# Patient Record
Sex: FEMALE | Race: WHITE | Employment: OTHER | URBAN - METROPOLITAN AREA
[De-identification: names, ages, dates, MRNs, and addresses within clinical notes are randomized per-mention and may not be internally consistent; named-entity substitution may affect disease eponyms.]

---

## 2017-06-13 PROBLEM — M25.561 KNEE PAIN, RIGHT: Status: ACTIVE | Noted: 2017-06-13

## 2018-05-29 ENCOUNTER — HOSPITAL ENCOUNTER (EMERGENCY)
Age: 68
Discharge: HOME | End: 2018-05-29

## 2018-05-29 ENCOUNTER — APPOINTMENT (OUTPATIENT)
Dept: GENERAL RADIOLOGY | Age: 68
End: 2018-05-29

## 2018-05-29 VITALS
SYSTOLIC BLOOD PRESSURE: 156 MMHG | DIASTOLIC BLOOD PRESSURE: 82 MMHG | OXYGEN SATURATION: 98 % | RESPIRATION RATE: 16 BRPM | HEIGHT: 64 IN | HEART RATE: 70 BPM | WEIGHT: 160 LBS | TEMPERATURE: 98.2 F | BODY MASS INDEX: 27.31 KG/M2

## 2018-05-29 DIAGNOSIS — S92.102A CLOSED NONDISPLACED FRACTURE OF LEFT TALUS, UNSPECIFIED PORTION OF TALUS, INITIAL ENCOUNTER: Primary | ICD-10-CM

## 2018-05-29 PROCEDURE — 73610 X-RAY EXAM OF ANKLE: CPT

## 2018-05-29 PROCEDURE — 99284 EMERGENCY DEPT VISIT MOD MDM: CPT

## 2018-05-29 PROCEDURE — 29515 APPLICATION SHORT LEG SPLINT: CPT

## 2018-05-29 PROCEDURE — 73630 X-RAY EXAM OF FOOT: CPT

## 2018-05-29 RX ORDER — ACETAMINOPHEN 500 MG
1000 TABLET ORAL EVERY 8 HOURS PRN
Qty: 20 TABLET | Refills: 0 | Status: SHIPPED | OUTPATIENT
Start: 2018-05-29 | End: 2018-07-02 | Stop reason: SINTOL

## 2018-05-29 ASSESSMENT — PAIN SCALES - GENERAL: PAINLEVEL_OUTOF10: 10

## 2018-05-29 ASSESSMENT — PAIN DESCRIPTION - ORIENTATION: ORIENTATION: RIGHT

## 2018-05-29 ASSESSMENT — PAIN DESCRIPTION - PAIN TYPE: TYPE: ACUTE PAIN

## 2018-05-29 ASSESSMENT — PAIN DESCRIPTION - LOCATION: LOCATION: ANKLE;FOOT

## 2018-05-30 ENCOUNTER — OFFICE VISIT (OUTPATIENT)
Dept: ORTHOPEDIC SURGERY | Age: 68
End: 2018-05-30
Payer: COMMERCIAL

## 2018-05-30 VITALS
HEART RATE: 78 BPM | BODY MASS INDEX: 27.31 KG/M2 | HEIGHT: 64 IN | DIASTOLIC BLOOD PRESSURE: 89 MMHG | SYSTOLIC BLOOD PRESSURE: 146 MMHG | WEIGHT: 160 LBS

## 2018-05-30 DIAGNOSIS — S99.912A ANKLE INJURIES, LEFT, INITIAL ENCOUNTER: Primary | ICD-10-CM

## 2018-05-30 PROCEDURE — 99213 OFFICE O/P EST LOW 20 MIN: CPT | Performed by: ORTHOPAEDIC SURGERY

## 2018-05-30 PROCEDURE — 28430 CLTX TALUS FRACTURE W/O MNPJ: CPT | Performed by: ORTHOPAEDIC SURGERY

## 2018-06-12 ENCOUNTER — APPOINTMENT (OUTPATIENT)
Dept: ULTRASOUND IMAGING | Age: 68
End: 2018-06-12
Payer: COMMERCIAL

## 2018-06-12 ENCOUNTER — HOSPITAL ENCOUNTER (EMERGENCY)
Age: 68
Discharge: HOME | End: 2018-06-13
Attending: EMERGENCY MEDICINE
Payer: COMMERCIAL

## 2018-06-12 VITALS
TEMPERATURE: 98.5 F | HEART RATE: 81 BPM | RESPIRATION RATE: 18 BRPM | HEIGHT: 64 IN | DIASTOLIC BLOOD PRESSURE: 103 MMHG | SYSTOLIC BLOOD PRESSURE: 174 MMHG | BODY MASS INDEX: 27.31 KG/M2 | OXYGEN SATURATION: 98 % | WEIGHT: 160 LBS

## 2018-06-12 DIAGNOSIS — M79.89 LEG SWELLING: Primary | ICD-10-CM

## 2018-06-12 PROCEDURE — 93971 EXTREMITY STUDY: CPT

## 2018-06-12 PROCEDURE — 99283 EMERGENCY DEPT VISIT LOW MDM: CPT

## 2018-06-12 ASSESSMENT — PAIN DESCRIPTION - ONSET: ONSET: ON-GOING

## 2018-06-12 ASSESSMENT — PAIN DESCRIPTION - PAIN TYPE: TYPE: ACUTE PAIN

## 2018-06-12 ASSESSMENT — PAIN DESCRIPTION - ORIENTATION: ORIENTATION: LEFT

## 2018-06-12 ASSESSMENT — PAIN DESCRIPTION - DESCRIPTORS: DESCRIPTORS: CONSTANT;DISCOMFORT;NUMBNESS

## 2018-06-12 ASSESSMENT — PAIN DESCRIPTION - LOCATION: LOCATION: FOOT;ANKLE

## 2018-06-12 ASSESSMENT — PAIN DESCRIPTION - FREQUENCY: FREQUENCY: CONTINUOUS

## 2018-06-12 ASSESSMENT — PAIN SCALES - GENERAL: PAINLEVEL_OUTOF10: 7

## 2018-07-02 ENCOUNTER — OFFICE VISIT (OUTPATIENT)
Dept: ORTHOPEDIC SURGERY | Age: 68
End: 2018-07-02

## 2018-07-02 VITALS
HEART RATE: 74 BPM | HEIGHT: 64 IN | DIASTOLIC BLOOD PRESSURE: 84 MMHG | WEIGHT: 160 LBS | SYSTOLIC BLOOD PRESSURE: 138 MMHG | BODY MASS INDEX: 27.31 KG/M2 | TEMPERATURE: 98.2 F

## 2018-07-02 DIAGNOSIS — S92.102D CLOSED NONDISPLACED FRACTURE OF LEFT TALUS WITH ROUTINE HEALING, UNSPECIFIED PORTION OF TALUS, SUBSEQUENT ENCOUNTER: Primary | ICD-10-CM

## 2018-07-02 PROCEDURE — 99024 POSTOP FOLLOW-UP VISIT: CPT | Performed by: ORTHOPAEDIC SURGERY

## 2018-07-02 RX ORDER — MULTIVIT WITH MINERALS/LUTEIN
1000 TABLET ORAL DAILY
COMMUNITY

## 2018-08-01 ENCOUNTER — OFFICE VISIT (OUTPATIENT)
Dept: ORTHOPEDIC SURGERY | Age: 68
End: 2018-08-01

## 2018-08-01 VITALS — RESPIRATION RATE: 16 BRPM | TEMPERATURE: 98.3 F

## 2018-08-01 DIAGNOSIS — S92.155D CLOSED NONDISPLACED AVULSION FRACTURE OF LEFT TALUS WITH ROUTINE HEALING, SUBSEQUENT ENCOUNTER: Primary | ICD-10-CM

## 2018-08-01 PROCEDURE — 99024 POSTOP FOLLOW-UP VISIT: CPT | Performed by: ORTHOPAEDIC SURGERY

## 2019-05-20 ENCOUNTER — APPOINTMENT (OUTPATIENT)
Dept: CT IMAGING | Age: 69
End: 2019-05-20
Payer: COMMERCIAL

## 2019-05-20 ENCOUNTER — HOSPITAL ENCOUNTER (EMERGENCY)
Age: 69
Discharge: LEFT BEFORE DISCHARGE | End: 2019-05-20
Attending: EMERGENCY MEDICINE
Payer: COMMERCIAL

## 2019-05-20 VITALS
DIASTOLIC BLOOD PRESSURE: 78 MMHG | RESPIRATION RATE: 18 BRPM | BODY MASS INDEX: 27.31 KG/M2 | HEIGHT: 64 IN | TEMPERATURE: 98 F | OXYGEN SATURATION: 98 % | SYSTOLIC BLOOD PRESSURE: 130 MMHG | HEART RATE: 80 BPM | WEIGHT: 160 LBS

## 2019-05-20 DIAGNOSIS — K62.5 BRIGHT RED BLOOD PER RECTUM: Primary | ICD-10-CM

## 2019-05-20 DIAGNOSIS — R10.84 GENERALIZED ABDOMINAL PAIN: ICD-10-CM

## 2019-05-20 DIAGNOSIS — K92.2 LOWER GI BLEED: ICD-10-CM

## 2019-05-20 LAB
ALBUMIN SERPL-MCNC: 4.6 G/DL (ref 3.5–5.2)
ALP BLD-CCNC: 79 U/L (ref 35–104)
ALT SERPL-CCNC: 17 U/L (ref 0–32)
ANION GAP SERPL CALCULATED.3IONS-SCNC: 9 MMOL/L (ref 7–16)
AST SERPL-CCNC: 20 U/L (ref 0–31)
BASOPHILS ABSOLUTE: 0.07 E9/L (ref 0–0.2)
BASOPHILS RELATIVE PERCENT: 1.1 % (ref 0–2)
BILIRUB SERPL-MCNC: 0.4 MG/DL (ref 0–1.2)
BUN BLDV-MCNC: 11 MG/DL (ref 8–23)
CALCIUM SERPL-MCNC: 9.8 MG/DL (ref 8.6–10.2)
CHLORIDE BLD-SCNC: 102 MMOL/L (ref 98–107)
CO2: 30 MMOL/L (ref 22–29)
CREAT SERPL-MCNC: 0.9 MG/DL (ref 0.5–1)
EOSINOPHILS ABSOLUTE: 0.29 E9/L (ref 0.05–0.5)
EOSINOPHILS RELATIVE PERCENT: 4.4 % (ref 0–6)
GFR AFRICAN AMERICAN: >60
GFR NON-AFRICAN AMERICAN: >60 ML/MIN/1.73
GLUCOSE BLD-MCNC: 96 MG/DL (ref 74–99)
HCT VFR BLD CALC: 42.6 % (ref 34–48)
HEMOGLOBIN: 13.6 G/DL (ref 11.5–15.5)
IMMATURE GRANULOCYTES #: 0.01 E9/L
IMMATURE GRANULOCYTES %: 0.2 % (ref 0–5)
INR BLD: 1
LACTIC ACID: 1.1 MMOL/L (ref 0.5–2.2)
LYMPHOCYTES ABSOLUTE: 1.64 E9/L (ref 1.5–4)
LYMPHOCYTES RELATIVE PERCENT: 25 % (ref 20–42)
MCH RBC QN AUTO: 29.4 PG (ref 26–35)
MCHC RBC AUTO-ENTMCNC: 31.9 % (ref 32–34.5)
MCV RBC AUTO: 92 FL (ref 80–99.9)
MONOCYTES ABSOLUTE: 0.55 E9/L (ref 0.1–0.95)
MONOCYTES RELATIVE PERCENT: 8.4 % (ref 2–12)
NEUTROPHILS ABSOLUTE: 4.01 E9/L (ref 1.8–7.3)
NEUTROPHILS RELATIVE PERCENT: 60.9 % (ref 43–80)
PDW BLD-RTO: 13.4 FL (ref 11.5–15)
PLATELET # BLD: 201 E9/L (ref 130–450)
PMV BLD AUTO: 12.7 FL (ref 7–12)
POTASSIUM SERPL-SCNC: 4.4 MMOL/L (ref 3.5–5)
PROTHROMBIN TIME: 11.1 SEC (ref 9.3–12.4)
RBC # BLD: 4.63 E12/L (ref 3.5–5.5)
SODIUM BLD-SCNC: 141 MMOL/L (ref 132–146)
TOTAL PROTEIN: 7.1 G/DL (ref 6.4–8.3)
WBC # BLD: 6.6 E9/L (ref 4.5–11.5)

## 2019-05-20 PROCEDURE — 80053 COMPREHEN METABOLIC PANEL: CPT

## 2019-05-20 PROCEDURE — 85025 COMPLETE CBC W/AUTO DIFF WBC: CPT

## 2019-05-20 PROCEDURE — 99284 EMERGENCY DEPT VISIT MOD MDM: CPT

## 2019-05-20 PROCEDURE — 85610 PROTHROMBIN TIME: CPT

## 2019-05-20 PROCEDURE — 83605 ASSAY OF LACTIC ACID: CPT

## 2019-05-20 RX ORDER — AMOXICILLIN AND CLAVULANATE POTASSIUM 875; 125 MG/1; MG/1
1 TABLET, FILM COATED ORAL 2 TIMES DAILY
Qty: 20 TABLET | Refills: 0 | Status: SHIPPED | OUTPATIENT
Start: 2019-05-20 | End: 2019-05-30

## 2019-05-20 ASSESSMENT — ENCOUNTER SYMPTOMS
CHEST TIGHTNESS: 0
COLOR CHANGE: 0
SHORTNESS OF BREATH: 0
NAUSEA: 0
DIARRHEA: 0
BLOOD IN STOOL: 1
VOMITING: 0
COUGH: 0
ABDOMINAL PAIN: 1
BACK PAIN: 0

## 2019-05-20 NOTE — ED PROVIDER NOTES
normal. She exhibits no distension. There is tenderness (mild, right lateral). Genitourinary: Rectal exam shows guaiac negative stool (Negative guaiac, no palpable hemorrhoids). Musculoskeletal: Normal range of motion. She exhibits no edema. Lymphadenopathy:     She has no cervical adenopathy. Neurological: She is alert and oriented to person, place, and time. Skin: Skin is warm and dry. No rash noted. She is not diaphoretic. No pallor. Nursing note and vitals reviewed. Procedures    MDM    ED Course as of May 20 1718   Mon May 20, 2019   1644 Attempted CT - patient became upset due to claustrophobia. Offered patient ativan to aid in performing CT, patient states she wishes to leave now. Discussed awaiting for result of labs to which she consented. Discussed risks of not having imaging performed to which she verbalized understanding. [RU]      ED Course User Index  [RU] Melissa Woody, DO       --------------------------------------------- PAST HISTORY ---------------------------------------------  Past Medical History:  has a past medical history of Back pain, Colitis, and Hypertension. Past Surgical History:  has a past surgical history that includes cyst removal and Colonoscopy. Social History:  reports that she has never smoked. She has never used smokeless tobacco. She reports that she does not drink alcohol or use drugs. Family History: family history is not on file. The patients home medications have been reviewed. Allergies: Latex; Iodine; Lomotil [diphenoxylate-atropine]; Other; Oxycodone; Pepcid [famotidine]; Percocet [oxycodone-acetaminophen]; Ultram [tramadol]; Zovirax [acyclovir]; Codeine; Diphenoxylate-atropine; Diprolene [betamethasone dipropionate aug]; Flagyl [metronidazole];  Naproxen; Prednisone; and Tolectin [tolmetin sodium]    -------------------------------------------------- RESULTS -------------------------------------------------    Lab  No results found for this visit on 05/20/19. Radiology  CT ABDOMEN PELVIS WO CONTRAST Additional Contrast? None    (Results Pending)       ------------------------- NURSING NOTES AND VITALS REVIEWED ---------------------------  Date / Time Roomed:  5/20/2019  3:28 PM  ED Bed Assignment:  07/07    The nursing notes within the ED encounter and vital signs as below have been reviewed. Patient Vitals for the past 24 hrs:   BP Temp Temp src Pulse Resp SpO2 Height Weight   05/20/19 1450 138/85 98.2 °F (36.8 °C) Oral 70 16 99 % 5' 4\" (1.626 m) 160 lb (72.6 kg)       Oxygen Saturation Interpretation: Normal    ------------------------------------------ PROGRESS NOTES ------------------------------------------  Re-evaluation(s):  4:20 PM: Attending updated. Case and plan discussed. Evaluated patient, reports taking up to 12 ASA tablets daily for her chronic back pain. 5:19 PM: Discussed again with patient options, wishes to leave at this time. Results reviewed. Alert, oriented, and has decision making capacity.    --------------------------------------- ED Clinical Course/MDM --------------------------------------    Patient is a 69-year-old female presenting with bright red blood per rectum. Labs, imaging ordered. Patient was unable to undergo CT scan due to claustrophobia. She adamantly refuses any premedication for a 2nd attempt. Patient was upset, wish to immediately leave the department, however but she did consent to awaiting her lab results. Hemoglobin stable, vital stable. No further bleeding here in the department reported. Patient was counseled on risks, benefits, alternatives but wished to leave against medical advice at this time. Patient is alert, oriented, appears to have decision-making capacity. She is aware she may return at any time. Decision made to cover empirically with Augmentin. She'll follow closely with her PCP outpatient.     5:19 PM  I have spoken with the patient and discussed todays availabe results to this point, in addition to providing specific details for the plan of care and counseling regarding the diagnosis and prognosis. Their questions are answered, however they are not agreeable to admission.     --------------------------------- ADDITIONAL PROVIDER NOTES ---------------------------------  This patient has chosen to leave against medical advice. I have personally explained to them that choosing to do so may result in permanent bodily harm or death. I discussed at length that without further evaluation and monitoring there may be unforeseen circumstances and deterioration resulting in permanent bodily harm or death as a result of their choice. They are alert, oriented, and competent at this time. They state that they are aware of the serious risks as explained, but they continue to wish to leave against medical   advice. In light of their decision to leave against medical advice, follow-up has been arranged and they are aware of the importance of following up as instructed. They have been advised that they should return to the ED immediately if they change their mind at any time, or if their condition begins to change or worsen. The follow up plan has been discussed in detail and they are aware of the specific conditions for emergent return, as well as the importance of follow-up. New Prescriptions    AMOXICILLIN-CLAVULANATE (AUGMENTIN) 875-125 MG PER TABLET    Take 1 tablet by mouth 2 times daily for 10 days       Diagnosis:  1. Bright red blood per rectum    2. Lower GI bleed    3. Generalized abdominal pain        Disposition:  Patient's disposition: Left against medical advice. Patient's condition is stable.          Katharina Gilliam DO  Resident  05/20/19 2227

## 2020-01-15 ENCOUNTER — OFFICE VISIT (OUTPATIENT)
Dept: ORTHOPEDIC SURGERY | Age: 70
End: 2020-01-15
Payer: COMMERCIAL

## 2020-01-15 VITALS
WEIGHT: 160 LBS | SYSTOLIC BLOOD PRESSURE: 149 MMHG | HEIGHT: 64 IN | HEART RATE: 71 BPM | BODY MASS INDEX: 27.31 KG/M2 | DIASTOLIC BLOOD PRESSURE: 87 MMHG

## 2020-01-15 PROCEDURE — 99214 OFFICE O/P EST MOD 30 MIN: CPT | Performed by: ORTHOPAEDIC SURGERY

## 2020-01-15 RX ORDER — DESOXIMETASONE 2.5 MG/G
CREAM TOPICAL
COMMUNITY
Start: 2019-10-14

## 2020-01-15 RX ORDER — FOLIC ACID 0.8 MG
TABLET ORAL
COMMUNITY

## 2020-01-15 NOTE — PROGRESS NOTES
tablet, , Disp: , Rfl:     cyclobenzaprine (FLEXERIL) 5 MG tablet, Take 5 mg by mouth nightly , Disp: , Rfl:     aspirin 325 MG tablet, Take 325 mg by mouth every 4 hours as needed for Pain, Disp: , Rfl:     Multiple Vitamins-Minerals (THERAPEUTIC MULTIVITAMIN-MINERALS) tablet, Take 2 tablets by mouth daily, Disp: , Rfl:     vitamin E 400 UNIT capsule, Take 800 Units by mouth daily, Disp: , Rfl:     ALLERGIES  Allergies   Allergen Reactions    Latex Swelling    Iodine Anaphylaxis    Lomotil [Diphenoxylate-Atropine]     Other      nitroimidazole derivatives and apriso    Oxycodone     Pepcid [Famotidine]     Percocet [Oxycodone-Acetaminophen]     Ultram [Tramadol]     Zovirax [Acyclovir] Swelling    Codeine Hives and Rash    Diphenoxylate-Atropine Rash    Diprolene [Betamethasone Dipropionate Aug] Rash    Flagyl [Metronidazole] Palpitations    Naproxen Rash    Prednisone Rash    Tolectin [Tolmetin Sodium] Rash       Controlled Substances Monitoring:      REVIEW OF SYSTEMS:     Constitutional:  Negative for weight loss, fevers, chills, fatigue  Cardiovascular: Negative for chest pain, palpitations  Pulmonary: Negative for shortness of breath, labored breathing, cough  GI: negative for abdominal pain, nausea, vomitting   MSK: per HPI  Skin: negative for rash, open wounds    All other systems reviewed and are negative           PHYSICAL EXAM     Vitals:    01/15/20 1432   BP: (!) 149/87   Site: Left Upper Arm   Position: Sitting   Cuff Size: Medium Adult   Pulse: 71   Weight: 160 lb (72.6 kg)   Height: 5' 4\" (1.626 m)       Height: 5' 4\" (1.626 m)  Weight: 160 lb per pt  BMI:  Body mass index is 27.46 kg/m². General: The patient is alert and oriented x 3, appears to be stated age and in no distress. HEENT: head is normocephalic, atraumatic. EOMI. Neck: supple, trachea midline, no thyromegaly   Cardiovascular: peripheral pulses palpable.   Normal Capillary refill   Respiratory: breathing unlabored, chest expansion symmetric   Skin: no rash, no open wounds, no erythema  Psych: normal affect; mood stable  Neurologic: gait normal, sensation grossly intact in extremities  MSK:     Hip:  On exam of the hip, there is tenderness directly over the greater trochanter. There is mild pain with resisted abduction. There is no pain in the groin with logroll. No pain with hip flexion or impingement maneuvers     IMAGING:    XR: AP pelvis, AP and Lateral of the involved left  hip display maintained joint space. No evidence of arthritic changes. No acute abnormality    Impression: Normal hip x-ray      ASSESSMENT  Left hip pain, contusion versus trochanteric bursitis/tendinitis    PLAN  We discussed her hip today. She is having mainly lateral based pain. She has point tenderness over the trochanter. We discussed possible abductor injury versus trochanteric bursitis versus contusion. I did discuss a steroid shot with her but she declined. She would like to continue anti-inflammatories. We gave her a copy of the home exercises for her hip.   We will see her back in 6 weeks to reassess        Doroteo Vanegas MD  Orthopaedic Surgery   1/15/20  2:36 PM

## 2020-02-13 ENCOUNTER — OFFICE VISIT (OUTPATIENT)
Dept: FAMILY MEDICINE CLINIC | Age: 70
End: 2020-02-13
Payer: COMMERCIAL

## 2020-02-13 VITALS
HEART RATE: 85 BPM | OXYGEN SATURATION: 98 % | HEIGHT: 64 IN | TEMPERATURE: 98 F | SYSTOLIC BLOOD PRESSURE: 132 MMHG | RESPIRATION RATE: 18 BRPM | BODY MASS INDEX: 28.51 KG/M2 | DIASTOLIC BLOOD PRESSURE: 80 MMHG | WEIGHT: 167 LBS

## 2020-02-13 PROCEDURE — 90715 TDAP VACCINE 7 YRS/> IM: CPT | Performed by: PHYSICIAN ASSISTANT

## 2020-02-13 PROCEDURE — 90471 IMMUNIZATION ADMIN: CPT | Performed by: PHYSICIAN ASSISTANT

## 2020-02-13 PROCEDURE — 12002 RPR S/N/AX/GEN/TRNK2.6-7.5CM: CPT | Performed by: PHYSICIAN ASSISTANT

## 2020-02-13 PROCEDURE — 99214 OFFICE O/P EST MOD 30 MIN: CPT | Performed by: PHYSICIAN ASSISTANT

## 2020-02-13 RX ORDER — LIDOCAINE HYDROCHLORIDE 10 MG/ML
5 INJECTION, SOLUTION EPIDURAL; INFILTRATION; INTRACAUDAL; PERINEURAL ONCE
Status: DISCONTINUED | OUTPATIENT
Start: 2020-02-13 | End: 2020-02-13

## 2020-02-13 NOTE — PROGRESS NOTES
GENTLY AND COMPLETELY, Disp: , Rfl:     Magnesium 500 MG CAPS, Take by mouth, Disp: , Rfl:     Ascorbic Acid (VITAMIN C) 1000 MG tablet, Take 1,000 mg by mouth daily, Disp: , Rfl:     Misc Natural Products (TART CHERRY ADVANCED PO), Take by mouth, Disp: , Rfl:     bumetanide (BUMEX) 0.5 MG tablet, , Disp: , Rfl:     aspirin 325 MG tablet, Take 325 mg by mouth every 4 hours as needed for Pain, Disp: , Rfl:     Multiple Vitamins-Minerals (THERAPEUTIC MULTIVITAMIN-MINERALS) tablet, Take 2 tablets by mouth daily, Disp: , Rfl:     Allergies: Allergies   Allergen Reactions    Latex Swelling    Iodine Anaphylaxis    Lomotil [Diphenoxylate-Atropine]     Other      nitroimidazole derivatives and apriso    Oxycodone     Pepcid [Famotidine]     Percocet [Oxycodone-Acetaminophen]     Ultram [Tramadol]     Zovirax [Acyclovir] Swelling    Codeine Hives and Rash    Diphenoxylate-Atropine Rash    Diprolene [Betamethasone Dipropionate Aug] Rash    Flagyl [Metronidazole] Palpitations    Naproxen Rash    Prednisone Rash    Tolectin [Tolmetin Sodium] Rash       Social History:     Social History     Tobacco Use    Smoking status: Never Smoker    Smokeless tobacco: Never Used   Substance Use Topics    Alcohol use: No    Drug use: No       Patient lives at home. Physical Exam:     Vitals:    02/13/20 1222   BP: 132/80   Pulse: 85   Resp: 18   Temp: 98 °F (36.7 °C)   SpO2: 98%   Weight: 167 lb (75.8 kg)   Height: 5' 4\" (1.626 m)       Exam:  Physical Exam  Vital signs reviewed and nurse's notes. The patient is not hypoxic. General: Alert, no acute distress, patient resting comfortably   Skin: warm, intact, no pallor noted   Head: Normocephalic, atraumatic   Eye: Normal conjunctiva   Respiratory: No acute distress   Musculoskeletal: Left palmar laceration that is 3.25 cm noted to the radial aspect of the palm. It is transverse and there is a small tail in the ulnar  midportion of the palm.   The patient has slight bleeding from the area. The patient has relatively superficial laceration. There is no evidence of foreign body. The patient had ability to flex and extend all digits. The patient had 5/5 strength with flexion of the thumb, index finger, middle finger, ring finger and little finger. The patient had normal sensation. The patient normal capillary refill. Pulses are intact at radius 2+. The patient had no other lacerations sustained. No deficit to the left wrist or the left elbow. Neurological: alert and orient x4, normal sensory and motor observed. Psychiatric: Cooperative        Testing:     Previous chart reviewed    Tetanus was not noted to be up-to-date in the chart although the patient is unsure as well    Medical Decision Making:     Laceration repair:   Done under sterile conditions. The use of Shur-Clens was used to prep and clean the area. Local injection with lidocaine 1% was used, approximately 5 cc. The wound was irrigated copiously with normal saline. The wound was explored there was no evidence of foreign material. The laceration was approximated with 4-0 nylon. 5 simple interrupted sutures were placed. Patient tolerated the procedure well. The patient was neurovascularly intact post. the patient had bacitracin applied to the laceration and a dry sterile dressing was place. The patient will need to follow-up in the next 10-14 days for removal.    Tetanus was updated    The patient is neurovascularly intact. The patient had topical antibiotic ointment applied and a pressure type dressing. The patient will change his dressing throughout the course of the evening. Continue to monitor the symptoms at home. She will use topical antibiotic ointment. Patient will follow-up with primary care physician in 10 to 14 days to have the sutures removed. Patient was comfortable with the plan has no other questions or concerns at this time.     Clinical Impression:   [facility] was seen today

## 2020-02-26 ENCOUNTER — OFFICE VISIT (OUTPATIENT)
Dept: FAMILY MEDICINE CLINIC | Age: 70
End: 2020-02-26
Payer: COMMERCIAL

## 2020-02-26 VITALS
WEIGHT: 167 LBS | TEMPERATURE: 98.6 F | HEART RATE: 74 BPM | SYSTOLIC BLOOD PRESSURE: 130 MMHG | OXYGEN SATURATION: 98 % | BODY MASS INDEX: 28.51 KG/M2 | HEIGHT: 64 IN | DIASTOLIC BLOOD PRESSURE: 80 MMHG

## 2020-02-26 PROCEDURE — 99213 OFFICE O/P EST LOW 20 MIN: CPT | Performed by: FAMILY MEDICINE

## 2020-02-26 ASSESSMENT — ENCOUNTER SYMPTOMS
EYE PAIN: 0
SHORTNESS OF BREATH: 0
NAUSEA: 0
BACK PAIN: 0
VOMITING: 0
DIARRHEA: 0
WHEEZING: 0
CHEST TIGHTNESS: 0
CONSTIPATION: 0
TROUBLE SWALLOWING: 0
SINUS PAIN: 0
COUGH: 0
SORE THROAT: 0
ABDOMINAL PAIN: 0

## 2020-02-26 NOTE — PROGRESS NOTES
2/26/20    Name: José Miguel Medrano  BPQ:7/[birth year]10   Sex:female   Age:74 y.o. Chief Complaint   Patient presents with    Suture / Staple Removal     Patient is here for suture removal. She has five stitches on left palm. Has been putting ointment on it every single day    Healing  Not red  No drinaage        Review of Systems   Constitutional: Negative for appetite change, fatigue and fever. HENT: Negative for congestion, ear pain, sinus pain, sore throat and trouble swallowing. Eyes: Negative for pain. Respiratory: Negative for cough, chest tightness, shortness of breath and wheezing. Cardiovascular: Negative for chest pain, palpitations and leg swelling. Gastrointestinal: Negative for abdominal pain, constipation, diarrhea, nausea and vomiting. Endocrine: Negative for cold intolerance and heat intolerance. Genitourinary: Negative for difficulty urinating, hematuria and pelvic pain. Musculoskeletal: Negative for back pain, gait problem and joint swelling. Skin: Positive for wound. Negative for rash. Neurological: Negative for dizziness, syncope and headaches. Hematological: Negative for adenopathy. Psychiatric/Behavioral: Negative for confusion, sleep disturbance and suicidal ideas.            Current Outpatient Medications:     desoximetasone (TOPICORT) 0.25 % cream, APPLY A THIN LAYER TO THE AFFECTED AREA(S) 2 TIMES PER DAY RUB IN GENTLY AND COMPLETELY, Disp: , Rfl:     Magnesium 500 MG CAPS, Take by mouth, Disp: , Rfl:     Ascorbic Acid (VITAMIN C) 1000 MG tablet, Take 1,000 mg by mouth daily, Disp: , Rfl:     Misc Natural Products (TART CHERRY ADVANCED PO), Take by mouth, Disp: , Rfl:     bumetanide (BUMEX) 0.5 MG tablet, , Disp: , Rfl:     aspirin 325 MG tablet, Take 325 mg by mouth every 4 hours as needed for Pain, Disp: , Rfl:     Multiple Vitamins-Minerals (THERAPEUTIC MULTIVITAMIN-MINERALS) tablet, Take 2 tablets by mouth daily, Disp: , Rfl:   Allergies   Allergen Reactions healing  Sutures removed with out difficulty but edges slightly seperated, benzoine used and steri strips placed          Chemojarad Romeo was seen today for suture / staple removal.    Diagnoses and all orders for this visit:    Open wound of palm, left, subsequent encounter  Comments:  sutures emoved without difficulty  steri strips placed due to edges seperating  wound not red and no drinage    wound care reviewed  No more ointment  Keep dry and clean  F/u if redness or drainage      I independently reviewed and updated the chief complaint, HPI, past medical and surgical history, medications, allergies and ROS as entered by the LPN. Seen by:   Shavon Henriquez, DO

## 2020-07-06 ENCOUNTER — OFFICE VISIT (OUTPATIENT)
Dept: ORTHOPEDIC SURGERY | Age: 70
End: 2020-07-06
Payer: COMMERCIAL

## 2020-07-06 VITALS — WEIGHT: 165 LBS | BODY MASS INDEX: 28.17 KG/M2 | HEIGHT: 64 IN | TEMPERATURE: 96.8 F

## 2020-07-06 PROCEDURE — 99213 OFFICE O/P EST LOW 20 MIN: CPT | Performed by: ORTHOPAEDIC SURGERY

## 2025-02-01 ENCOUNTER — HOSPITAL ENCOUNTER (EMERGENCY)
Age: 75
Discharge: HOME | End: 2025-02-01
Payer: COMMERCIAL

## 2025-02-01 VITALS
HEART RATE: 77 BPM | OXYGEN SATURATION: 98 % | WEIGHT: 148 LBS | BODY MASS INDEX: 25.27 KG/M2 | HEIGHT: 64 IN | DIASTOLIC BLOOD PRESSURE: 85 MMHG | RESPIRATION RATE: 16 BRPM | TEMPERATURE: 97.7 F | SYSTOLIC BLOOD PRESSURE: 155 MMHG

## 2025-02-01 DIAGNOSIS — R04.0 EPISTAXIS: Primary | ICD-10-CM

## 2025-02-01 PROCEDURE — 99283 EMERGENCY DEPT VISIT LOW MDM: CPT

## 2025-02-01 PROCEDURE — 30901 CONTROL OF NOSEBLEED: CPT

## 2025-02-01 PROCEDURE — C9046 COCAINE HCL NASAL SOLUTION: HCPCS | Performed by: NURSE PRACTITIONER

## 2025-02-01 PROCEDURE — 6370000000 HC RX 637 (ALT 250 FOR IP): Performed by: NURSE PRACTITIONER

## 2025-02-01 RX ORDER — SODIUM CHLORIDE/ALOE VERA
GEL (GRAM) NASAL PRN
Qty: 1 EACH | Refills: 3 | Status: SHIPPED | OUTPATIENT
Start: 2025-02-01

## 2025-02-01 RX ORDER — OXYMETAZOLINE HYDROCHLORIDE 0.05 G/100ML
2 SPRAY NASAL ONCE
Status: COMPLETED | OUTPATIENT
Start: 2025-02-01 | End: 2025-02-01

## 2025-02-01 RX ORDER — COCAINE HYDROCHLORIDE 40 MG/ML
160 SOLUTION NASAL ONCE
Status: COMPLETED | OUTPATIENT
Start: 2025-02-01 | End: 2025-02-01

## 2025-02-01 RX ADMIN — OXYMETAZOLINE HYDROCHLORIDE 2 SPRAY: 0.5 SPRAY NASAL at 17:29

## 2025-02-01 RX ADMIN — COCAINE HYDROCHLORIDE 160 MG: 40 SOLUTION NASAL at 17:29

## 2025-02-01 ASSESSMENT — PAIN - FUNCTIONAL ASSESSMENT: PAIN_FUNCTIONAL_ASSESSMENT: NONE - DENIES PAIN

## 2025-02-01 ASSESSMENT — LIFESTYLE VARIABLES
HOW MANY STANDARD DRINKS CONTAINING ALCOHOL DO YOU HAVE ON A TYPICAL DAY: PATIENT DOES NOT DRINK
HOW OFTEN DO YOU HAVE A DRINK CONTAINING ALCOHOL: NEVER

## 2025-02-01 NOTE — ED PROVIDER NOTES
Independent JEFFREY Visit.          [facility]  Department of Emergency Medicine   ED  Encounter Note  Admit Date/RoomTime: 2025  4:17 PM  ED Room:     NAME: Sandra Bobo  : 1950  MRN: [id number]     Chief Complaint:  Epistaxis (Denies any thinners, currently bleeding, denies injury )    History of Present Illness        Sandra Bobo is a 74 y.o. old female who presents to the emergency department by private vehicle with spouse, for sudden onset of non-traumatic right sided nosebleed, which began 3:45 PM prior to arrival.  Since onset the symptoms have been persistent.  She takes ASA three times daily because it is the only thing that she can take for her chronic arthritic back pain because of her numerous allergies. She has no associated symptoms. The symptoms are worsened by nothing and relieved by pinching off the nostril. She reports she had blood work in November because she was having nose bleeds and was told her vitamin D levels were low and has not had to have follow up with ENT.  She denies any recent URI symptoms or any sinus pressure pain.    ROS   Pertinent positives and negatives are stated within HPI, all other systems reviewed and are negative.    Past Medical History:  has a past medical history of Back pain, Colitis, and Hypertension.    Surgical History:  has a past surgical history that includes cyst removal and Colonoscopy.    Social History:  reports that she has never smoked. She has never used smokeless tobacco. She reports that she does not drink alcohol and does not use drugs.    Family History: family history is not on file.     Allergies: Latex, Fish-derived products, Iodine, Lomotil [diphenoxylate-atropine], Other, Oxycodone, Pepcid [famotidine], Percocet [oxycodone-acetaminophen], Ultram [tramadol], Zovirax [acyclovir], Codeine, Diphenoxylate-atropine, Diprolene [betamethasone dipropionate aug], Flagyl [metronidazole], Naproxen, Prednisone, and Tolectin

## 2025-02-07 ENCOUNTER — TELEPHONE (OUTPATIENT)
Dept: ENT CLINIC | Age: 75
End: 2025-02-07

## 2025-02-07 NOTE — TELEPHONE ENCOUNTER
Patient called and left voicemail requesting a call back to schedule appointment. Was in ED 2/1/25 for epistaxis, Dr. Barreto on call.

## 2025-02-10 ENCOUNTER — APPOINTMENT (OUTPATIENT)
Dept: GENERAL RADIOLOGY | Age: 75
End: 2025-02-10
Payer: MEDICARE

## 2025-02-10 ENCOUNTER — APPOINTMENT (OUTPATIENT)
Dept: CT IMAGING | Age: 75
End: 2025-02-10
Payer: MEDICARE

## 2025-02-10 ENCOUNTER — HOSPITAL ENCOUNTER (OUTPATIENT)
Age: 75
Setting detail: OBSERVATION
Discharge: HOME | End: 2025-02-12
Attending: EMERGENCY MEDICINE | Admitting: INTERNAL MEDICINE
Payer: MEDICARE

## 2025-02-10 DIAGNOSIS — R51.9 ACUTE INTRACTABLE HEADACHE, UNSPECIFIED HEADACHE TYPE: ICD-10-CM

## 2025-02-10 DIAGNOSIS — R20.0 LEFT ARM NUMBNESS: Primary | ICD-10-CM

## 2025-02-10 LAB
ALBUMIN SERPL-MCNC: 4.1 G/DL (ref 3.5–5.2)
ALP SERPL-CCNC: 107 U/L (ref 35–104)
ALT SERPL-CCNC: 12 U/L (ref 0–32)
ANION GAP SERPL CALCULATED.3IONS-SCNC: 11 MMOL/L (ref 7–16)
AST SERPL-CCNC: 18 U/L (ref 0–31)
BASOPHILS # BLD: 0.07 K/UL (ref 0–0.2)
BASOPHILS NFR BLD: 1 % (ref 0–2)
BILIRUB SERPL-MCNC: 0.3 MG/DL (ref 0–1.2)
BUN SERPL-MCNC: 15 MG/DL (ref 6–23)
CALCIUM SERPL-MCNC: 9.4 MG/DL (ref 8.6–10.2)
CHLORIDE SERPL-SCNC: 103 MMOL/L (ref 98–107)
CO2 SERPL-SCNC: 25 MMOL/L (ref 22–29)
CREAT SERPL-MCNC: 1 MG/DL (ref 0.5–1)
EOSINOPHIL # BLD: 0.35 K/UL (ref 0.05–0.5)
EOSINOPHILS RELATIVE PERCENT: 4 % (ref 0–6)
ERYTHROCYTE [DISTWIDTH] IN BLOOD BY AUTOMATED COUNT: 12.9 % (ref 11.5–15)
GFR, ESTIMATED: 62 ML/MIN/1.73M2
GLUCOSE SERPL-MCNC: 137 MG/DL (ref 74–99)
HCT VFR BLD AUTO: 42.4 % (ref 34–48)
HGB BLD-MCNC: 13.4 G/DL (ref 11.5–15.5)
IMM GRANULOCYTES # BLD AUTO: 0.03 K/UL (ref 0–0.58)
IMM GRANULOCYTES NFR BLD: 0 % (ref 0–5)
LYMPHOCYTES NFR BLD: 1.81 K/UL (ref 1.5–4)
LYMPHOCYTES RELATIVE PERCENT: 20 % (ref 20–42)
MCH RBC QN AUTO: 28.8 PG (ref 26–35)
MCHC RBC AUTO-ENTMCNC: 31.6 G/DL (ref 32–34.5)
MCV RBC AUTO: 91.2 FL (ref 80–99.9)
MONOCYTES NFR BLD: 0.91 K/UL (ref 0.1–0.95)
MONOCYTES NFR BLD: 10 % (ref 2–12)
NEUTROPHILS NFR BLD: 65 % (ref 43–80)
NEUTS SEG NFR BLD: 5.8 K/UL (ref 1.8–7.3)
PLATELET # BLD AUTO: 314 K/UL (ref 130–450)
PMV BLD AUTO: 12.2 FL (ref 7–12)
POTASSIUM SERPL-SCNC: 3.9 MMOL/L (ref 3.5–5)
PROT SERPL-MCNC: 7.3 G/DL (ref 6.4–8.3)
RBC # BLD AUTO: 4.65 M/UL (ref 3.5–5.5)
SODIUM SERPL-SCNC: 139 MMOL/L (ref 132–146)
TROPONIN I SERPL HS-MCNC: 19 NG/L (ref 0–9)
WBC OTHER # BLD: 9 K/UL (ref 4.5–11.5)

## 2025-02-10 PROCEDURE — 71045 X-RAY EXAM CHEST 1 VIEW: CPT

## 2025-02-10 PROCEDURE — 99285 EMERGENCY DEPT VISIT HI MDM: CPT

## 2025-02-10 PROCEDURE — 6360000004 HC RX CONTRAST MEDICATION: Performed by: RADIOLOGY

## 2025-02-10 PROCEDURE — 80053 COMPREHEN METABOLIC PANEL: CPT

## 2025-02-10 PROCEDURE — 85025 COMPLETE CBC W/AUTO DIFF WBC: CPT

## 2025-02-10 PROCEDURE — 84484 ASSAY OF TROPONIN QUANT: CPT

## 2025-02-10 PROCEDURE — 96375 TX/PRO/DX INJ NEW DRUG ADDON: CPT

## 2025-02-10 PROCEDURE — 96374 THER/PROPH/DIAG INJ IV PUSH: CPT

## 2025-02-10 PROCEDURE — 93005 ELECTROCARDIOGRAM TRACING: CPT | Performed by: PHYSICIAN ASSISTANT

## 2025-02-10 PROCEDURE — 70498 CT ANGIOGRAPHY NECK: CPT

## 2025-02-10 PROCEDURE — 70450 CT HEAD/BRAIN W/O DYE: CPT

## 2025-02-10 PROCEDURE — 6360000002 HC RX W HCPCS: Performed by: EMERGENCY MEDICINE

## 2025-02-10 PROCEDURE — 2580000003 HC RX 258

## 2025-02-10 PROCEDURE — 96361 HYDRATE IV INFUSION ADD-ON: CPT

## 2025-02-10 PROCEDURE — 70496 CT ANGIOGRAPHY HEAD: CPT

## 2025-02-10 RX ORDER — METOCLOPRAMIDE HYDROCHLORIDE 5 MG/ML
10 INJECTION INTRAMUSCULAR; INTRAVENOUS ONCE
Status: DISCONTINUED | OUTPATIENT
Start: 2025-02-10 | End: 2025-02-10

## 2025-02-10 RX ORDER — METHYLPREDNISOLONE SODIUM SUCCINATE 125 MG/2ML
125 INJECTION INTRAMUSCULAR; INTRAVENOUS ONCE
Status: COMPLETED | OUTPATIENT
Start: 2025-02-10 | End: 2025-02-10

## 2025-02-10 RX ORDER — 0.9 % SODIUM CHLORIDE 0.9 %
1000 INTRAVENOUS SOLUTION INTRAVENOUS ONCE
Status: COMPLETED | OUTPATIENT
Start: 2025-02-10 | End: 2025-02-11

## 2025-02-10 RX ORDER — LORAZEPAM 2 MG/ML
2 INJECTION INTRAMUSCULAR ONCE
Status: COMPLETED | OUTPATIENT
Start: 2025-02-10 | End: 2025-02-10

## 2025-02-10 RX ORDER — DIPHENHYDRAMINE HYDROCHLORIDE 50 MG/ML
25 INJECTION INTRAMUSCULAR; INTRAVENOUS ONCE
Status: DISCONTINUED | OUTPATIENT
Start: 2025-02-10 | End: 2025-02-10

## 2025-02-10 RX ORDER — METHYLPREDNISOLONE SODIUM SUCCINATE 40 MG/ML
40 INJECTION INTRAMUSCULAR; INTRAVENOUS ONCE
Status: DISCONTINUED | OUTPATIENT
Start: 2025-02-10 | End: 2025-02-10

## 2025-02-10 RX ORDER — IOPAMIDOL 755 MG/ML
75 INJECTION, SOLUTION INTRAVASCULAR
Status: COMPLETED | OUTPATIENT
Start: 2025-02-10 | End: 2025-02-10

## 2025-02-10 RX ORDER — ONDANSETRON 2 MG/ML
4 INJECTION INTRAMUSCULAR; INTRAVENOUS ONCE
Status: COMPLETED | OUTPATIENT
Start: 2025-02-10 | End: 2025-02-10

## 2025-02-10 RX ORDER — DIPHENHYDRAMINE HYDROCHLORIDE 50 MG/ML
50 INJECTION INTRAMUSCULAR; INTRAVENOUS ONCE
Status: COMPLETED | OUTPATIENT
Start: 2025-02-10 | End: 2025-02-10

## 2025-02-10 RX ADMIN — ONDANSETRON 4 MG: 2 INJECTION, SOLUTION INTRAMUSCULAR; INTRAVENOUS at 22:08

## 2025-02-10 RX ADMIN — SODIUM CHLORIDE 1000 ML: 9 INJECTION, SOLUTION INTRAVENOUS at 22:09

## 2025-02-10 RX ADMIN — LORAZEPAM 2 MG: 2 INJECTION INTRAMUSCULAR; INTRAVENOUS at 22:14

## 2025-02-10 RX ADMIN — DIPHENHYDRAMINE HYDROCHLORIDE 50 MG: 50 INJECTION INTRAMUSCULAR; INTRAVENOUS at 22:11

## 2025-02-10 RX ADMIN — IOPAMIDOL 75 ML: 755 INJECTION, SOLUTION INTRAVENOUS at 22:33

## 2025-02-10 RX ADMIN — METHYLPREDNISOLONE SODIUM SUCCINATE 125 MG: 125 INJECTION INTRAMUSCULAR; INTRAVENOUS at 22:19

## 2025-02-11 ENCOUNTER — APPOINTMENT (OUTPATIENT)
Dept: MRI IMAGING | Age: 75
End: 2025-02-11
Payer: MEDICARE

## 2025-02-11 PROBLEM — R29.90 STROKE-LIKE SYMPTOMS: Status: ACTIVE | Noted: 2025-02-11

## 2025-02-11 PROBLEM — I10 PRIMARY HYPERTENSION: Status: ACTIVE | Noted: 2025-02-11

## 2025-02-11 PROBLEM — R51.9 HEADACHE: Status: ACTIVE | Noted: 2025-02-11

## 2025-02-11 PROBLEM — R20.0 LEFT ARM NUMBNESS: Status: ACTIVE | Noted: 2025-02-11

## 2025-02-11 LAB
EKG ATRIAL RATE: 90 BPM
EKG P AXIS: 54 DEGREES
EKG P-R INTERVAL: 122 MS
EKG Q-T INTERVAL: 382 MS
EKG QRS DURATION: 102 MS
EKG QTC CALCULATION (BAZETT): 467 MS
EKG R AXIS: -5 DEGREES
EKG T AXIS: 9 DEGREES
EKG VENTRICULAR RATE: 90 BPM
TROPONIN I SERPL HS-MCNC: 12 NG/L (ref 0–9)

## 2025-02-11 PROCEDURE — 93010 ELECTROCARDIOGRAM REPORT: CPT | Performed by: INTERNAL MEDICINE

## 2025-02-11 PROCEDURE — 6360000002 HC RX W HCPCS: Performed by: NURSE PRACTITIONER

## 2025-02-11 PROCEDURE — 96375 TX/PRO/DX INJ NEW DRUG ADDON: CPT

## 2025-02-11 PROCEDURE — 6360000002 HC RX W HCPCS: Performed by: EMERGENCY MEDICINE

## 2025-02-11 PROCEDURE — 2500000003 HC RX 250 WO HCPCS: Performed by: NURSE PRACTITIONER

## 2025-02-11 PROCEDURE — 84484 ASSAY OF TROPONIN QUANT: CPT

## 2025-02-11 PROCEDURE — 99222 1ST HOSP IP/OBS MODERATE 55: CPT | Performed by: INTERNAL MEDICINE

## 2025-02-11 PROCEDURE — 6370000000 HC RX 637 (ALT 250 FOR IP): Performed by: STUDENT IN AN ORGANIZED HEALTH CARE EDUCATION/TRAINING PROGRAM

## 2025-02-11 PROCEDURE — G0378 HOSPITAL OBSERVATION PER HR: HCPCS

## 2025-02-11 PROCEDURE — 96361 HYDRATE IV INFUSION ADD-ON: CPT

## 2025-02-11 PROCEDURE — 70551 MRI BRAIN STEM W/O DYE: CPT

## 2025-02-11 PROCEDURE — 96372 THER/PROPH/DIAG INJ SC/IM: CPT

## 2025-02-11 PROCEDURE — APPSS45 APP SPLIT SHARED TIME 31-45 MINUTES: Performed by: NURSE PRACTITIONER

## 2025-02-11 PROCEDURE — 6360000002 HC RX W HCPCS: Performed by: STUDENT IN AN ORGANIZED HEALTH CARE EDUCATION/TRAINING PROGRAM

## 2025-02-11 PROCEDURE — 96376 TX/PRO/DX INJ SAME DRUG ADON: CPT

## 2025-02-11 RX ORDER — ONDANSETRON 2 MG/ML
4 INJECTION INTRAMUSCULAR; INTRAVENOUS EVERY 6 HOURS PRN
Status: DISCONTINUED | OUTPATIENT
Start: 2025-02-11 | End: 2025-02-12 | Stop reason: HOSPADM

## 2025-02-11 RX ORDER — ONDANSETRON 4 MG/1
4 TABLET, ORALLY DISINTEGRATING ORAL EVERY 8 HOURS PRN
Status: DISCONTINUED | OUTPATIENT
Start: 2025-02-11 | End: 2025-02-12 | Stop reason: HOSPADM

## 2025-02-11 RX ORDER — ASPIRIN 81 MG/1
81 TABLET, CHEWABLE ORAL DAILY
Status: DISCONTINUED | OUTPATIENT
Start: 2025-02-11 | End: 2025-02-12 | Stop reason: HOSPADM

## 2025-02-11 RX ORDER — KETOROLAC TROMETHAMINE 15 MG/ML
15 INJECTION, SOLUTION INTRAMUSCULAR; INTRAVENOUS EVERY 6 HOURS PRN
Status: DISCONTINUED | OUTPATIENT
Start: 2025-02-11 | End: 2025-02-12 | Stop reason: HOSPADM

## 2025-02-11 RX ORDER — KETOROLAC TROMETHAMINE 15 MG/ML
15 INJECTION, SOLUTION INTRAMUSCULAR; INTRAVENOUS ONCE
Status: COMPLETED | OUTPATIENT
Start: 2025-02-11 | End: 2025-02-11

## 2025-02-11 RX ORDER — BUMETANIDE 1 MG/1
0.5 TABLET ORAL DAILY
Status: DISCONTINUED | OUTPATIENT
Start: 2025-02-11 | End: 2025-02-12 | Stop reason: HOSPADM

## 2025-02-11 RX ORDER — LORAZEPAM 2 MG/ML
2 INJECTION INTRAMUSCULAR
Status: COMPLETED | OUTPATIENT
Start: 2025-02-11 | End: 2025-02-11

## 2025-02-11 RX ORDER — SODIUM CHLORIDE 0.9 % (FLUSH) 0.9 %
5-40 SYRINGE (ML) INJECTION EVERY 12 HOURS SCHEDULED
Status: DISCONTINUED | OUTPATIENT
Start: 2025-02-11 | End: 2025-02-12 | Stop reason: HOSPADM

## 2025-02-11 RX ORDER — ENOXAPARIN SODIUM 100 MG/ML
40 INJECTION SUBCUTANEOUS DAILY
Status: DISCONTINUED | OUTPATIENT
Start: 2025-02-11 | End: 2025-02-12 | Stop reason: HOSPADM

## 2025-02-11 RX ORDER — LORAZEPAM 2 MG/ML
2 INJECTION INTRAMUSCULAR ONCE
Status: COMPLETED | OUTPATIENT
Start: 2025-02-11 | End: 2025-02-11

## 2025-02-11 RX ORDER — POLYETHYLENE GLYCOL 3350 17 G/17G
17 POWDER, FOR SOLUTION ORAL DAILY PRN
Status: DISCONTINUED | OUTPATIENT
Start: 2025-02-11 | End: 2025-02-12 | Stop reason: HOSPADM

## 2025-02-11 RX ORDER — ASCORBIC ACID 500 MG
1000 TABLET ORAL DAILY
Status: DISCONTINUED | OUTPATIENT
Start: 2025-02-11 | End: 2025-02-12 | Stop reason: HOSPADM

## 2025-02-11 RX ORDER — SODIUM CHLORIDE 9 MG/ML
INJECTION, SOLUTION INTRAVENOUS PRN
Status: DISCONTINUED | OUTPATIENT
Start: 2025-02-11 | End: 2025-02-12 | Stop reason: HOSPADM

## 2025-02-11 RX ORDER — SODIUM CHLORIDE 0.9 % (FLUSH) 0.9 %
5-40 SYRINGE (ML) INJECTION PRN
Status: DISCONTINUED | OUTPATIENT
Start: 2025-02-11 | End: 2025-02-12 | Stop reason: HOSPADM

## 2025-02-11 RX ORDER — CLOPIDOGREL BISULFATE 75 MG/1
75 TABLET ORAL DAILY
Status: DISCONTINUED | OUTPATIENT
Start: 2025-02-11 | End: 2025-02-12 | Stop reason: HOSPADM

## 2025-02-11 RX ADMIN — OXYCODONE HYDROCHLORIDE AND ACETAMINOPHEN 1000 MG: 500 TABLET ORAL at 16:46

## 2025-02-11 RX ADMIN — ASPIRIN 81 MG CHEWABLE TABLET 81 MG: 81 TABLET CHEWABLE at 16:46

## 2025-02-11 RX ADMIN — KETOROLAC TROMETHAMINE 15 MG: 15 INJECTION, SOLUTION INTRAMUSCULAR; INTRAVENOUS at 00:41

## 2025-02-11 RX ADMIN — CLOPIDOGREL BISULFATE 75 MG: 75 TABLET ORAL at 16:46

## 2025-02-11 RX ADMIN — LORAZEPAM 2 MG: 2 INJECTION INTRAMUSCULAR; INTRAVENOUS at 14:08

## 2025-02-11 RX ADMIN — LORAZEPAM 2 MG: 2 INJECTION INTRAMUSCULAR; INTRAVENOUS at 04:49

## 2025-02-11 RX ADMIN — BUMETANIDE 0.5 MG: 1 TABLET ORAL at 16:46

## 2025-02-11 RX ADMIN — SODIUM CHLORIDE, PRESERVATIVE FREE 10 ML: 5 INJECTION INTRAVENOUS at 19:41

## 2025-02-11 RX ADMIN — ENOXAPARIN SODIUM 40 MG: 100 INJECTION SUBCUTANEOUS at 12:39

## 2025-02-11 ASSESSMENT — PAIN DESCRIPTION - DESCRIPTORS: DESCRIPTORS: SORE

## 2025-02-11 ASSESSMENT — PAIN DESCRIPTION - LOCATION: LOCATION: BACK

## 2025-02-11 ASSESSMENT — PAIN SCALES - GENERAL: PAINLEVEL_OUTOF10: 8

## 2025-02-11 NOTE — ED PROVIDER NOTES
[facility] EMERGENCY DEPARTMENT  EMERGENCY DEPARTMENT ENCOUNTER        Pt Name: Sandra Bobo  MRN: [id number]  Birthdate 1950  Date of evaluation: 2/10/2025  Provider: Alexia Rodriguez DO  PCP: Art Hendrix DO  Note Started: 9:39 PM EST 2/10/25    CHIEF COMPLAINT       Chief Complaint   Patient presents with    Numbness     Left arm numbness started about a half hour ago, states she had US earlier for possible blood clot in leg     Palpitations    Oral Swelling     Patient states her tongue feels swollen.  states he does not notice difference in speech        HISTORY OF PRESENT ILLNESS: 1 or more Elements   Sandra Bobo is a 74 y.o. female with a past medical history of hypertension who presents to the emergency department with chief complaint of left arm numbness, palpitations, and tingling of her tongue.  Patient states that the symptoms were sudden onset approximately 30 minutes ago.  She is currently experiencing the left arm numbness and describes it as a heaviness/pins-and-needles feeling.  She also states that she feels like her tongue is swollen and numb.   at bedside notes that there is no difference in her speech.  Patient is also endorsing palpitations and headache.  She is otherwise denying shortness of breath or chest pain.  Patient did have an ultrasound earlier this evening for a blood clot in her left leg.  She is otherwise denying any syncope, nausea, vomiting, ataxia, abdominal pain, fevers, chills, or lower extremity edema.    Nursing Notes were all reviewed and agreed with or any disagreements were addressed in the HPI.    REVIEW OF SYSTEMS :    Positives and Pertinent negatives as per HPI.    PAST MEDICAL HISTORY/Chronic Conditions Affecting Care    has a past medical history of Back pain, Colitis, and Hypertension.     SURGICAL HISTORY     Past Surgical History:   Procedure Laterality Date    COLONOSCOPY      CYST REMOVAL      ovarian  with the below findings:    CXR as interpreted by myself shows no evidence of pleural effusions, pneumothorax, or consolidations.    Interpretation per the Radiologist below, if available at the time of this note:    XR CHEST PORTABLE   Final Result   No acute process.         CT Head W/O Contrast   Final Result   No acute intracranial abnormality.         CTA HEAD W CONTRAST   Final Result   No large vessel occlusion in the head or neck.         CTA NECK W CONTRAST   Final Result   No large vessel occlusion in the head or neck.             PROCEDURES   Unless otherwise noted below, none      Medical Decision Making/Differential Diagnosis:   CC/HPI Summary, DDx, ED Course, Reassessment, Tests Considered, Patient expectation:   74 y.o. female who presents to the emergency department with chief complaint of left arm numbness, headache, and palpitations for the past 30 minutes.  Patient with a NIH of 0 initial examination.  Differential diagnosis includes was not limited to intracranial abnormality, carotid dissection, ACS, electrolyte abnormalities, among others.  Patient pretreated and taken to CT scan for evaluation for intracranial abnormalities.  CBC and CMP in the meantime without significant abnormalities.  CT without evidence of large vessel occlusion or acute intracranial abnormality.  Patient and ordered Benadryl and Toradol for headache control which did provide some relief however she was complaining of headache symptoms.  Delta troponin negative without acute ischemic changes noted on EKG.  Patient did continue to have the left arm numbness as well as the headache.  I had a discussion with her about admission for further evaluation for possible TIA.  She was agreeable with this plan.  I spoke with Dr. Elizabeth, hospitalist, who accepted patient for admission    Please refer to the ED Course as available for additional MDM.  ED Course as of 02/11/25 0304   Mon Feb 10, 2025   2210 74-year-old female with past

## 2025-02-11 NOTE — ED NOTES
Perfect served dr choudhary- concerning additional dosage of ativan prior to mri testing d/t claustrophobia

## 2025-02-11 NOTE — ED NOTES
Radiology Procedure Waiver   Name: Sandra Bobo  : 1950  MRN: [id number]    Date:  2/10/25    Time: 10:10 PM EST    Benefits of immediately proceeding with Radiology exam(s) without pre-testing outweigh the risks or are not indicated as specified below and therefore the following is/are being waived:    [] Pregnancy test   [] Patients LMP on-time and regular.   [] Patient had Tubal Ligation or has other Contraception Device.   [] Patient  is Menopausal or Premenarcheal.    [] Patient had Full or Partial Hysterectomy.    [x] Protocol for Iodine allergy    [] MRI Questionnaire     [x] BUN/Creatinine   [] Patient age w/no hx of renal dysfunction.   [] Patient on Dialysis.   [] Recent Normal Labs.  Electronically signed by Alexia Rodriguez DO on 2/10/25 at 10:10 PM EST

## 2025-02-11 NOTE — H&P
[facility] Hospitalist Group History and Physical      CHIEF COMPLAINT:  Left UE numbness, headache, palpitations, and oral swelling    History of Present Illness:  This is a 74 year old female with PMH significant for back pain, colitis, and hypertension.  Patient to ED due to left arm numbness, headache, palpitations, and oral tongue swelling starting 30 minutes prior to her arrival to ED.  Patient reports that she was reading newspaper at home she started feeling numbness in her left upper extremity.  Also at that time she felt like her tongue was enlarged and that her mouth was full of \"cotton.\"  Numbness and enlarged tongue symptoms have since subsided.  Patient also reports palpitations have calmed down and she does not feel them anymore.  Admits to still having headache across the front of her head.  Rates pain 6 out of 10.  Denies recent illness, fever, chills, shortness of breath, chest pain, abdominal pain, changes in bowel/bladder habits.  CT of the head/CTA neck/CTA head negative for acute stroke.  Patient received benadryl, Toradol, ativan, solumedrol, Zofran, and saline.  Will observe for additional evaluation and treatment.    Informant(s) for H&P: Patient and chart review    REVIEW OF SYSTEMS:  A comprehensive review of systems was negative except for: what is in the HPI      PMH:  Past Medical History:   Diagnosis Date    Back pain     Colitis     Hypertension        Surgical History:  Past Surgical History:   Procedure Laterality Date    COLONOSCOPY      CYST REMOVAL      ovarian       Medications Prior to Admission:    Prior to Admission medications    Medication Sig Start Date End Date Taking? Authorizing Provider   saline nasal gel (AYR) GEL by Nasal route as needed for Congestion 2/1/25   Margie Vee, APRN - CNP   desoximetasone (TOPICORT) 0.25 % cream APPLY A THIN LAYER TO THE AFFECTED AREA(S) 2 TIMES PER DAY RUB IN GENTLY AND COMPLETELY 10/14/19   Provider, MD Amanda   Magnesium  Physician Statement: Chris Elizabeth M.D., F.A.C.P.  Seen for new admission-- Acute and chronic problems addressed  Prior ER/Hospital and outpatient charts reviewed, including other providers notes, relevant labs and imaging. Meds reviewed.  discussion/coordination with ER/ (access center if applicable) +other providers and/or counseling patient/family +NP  I have seen patient+reviewed pertinent history and exam findings as documented by NP   I agree with assessment/plan as per NP note   (split billing based on medical complexity of case)  +My assessment of various problems addressed tonight as documented below:     ER requesting we admit for CVA like symptoms  She reports L UE and tongue numb +HA- ruleout complex migraine or anxiety DO  Rule out resolved angioedema- reportedly earlier that tongue felt swollen and numb +cotton in mouth sensation now resolved    +/- intermittent peripheral neuroapathy  No deficits on testing L UE in ER currently  Rule out TIA/CVA- plan MRI  Treat HA  Treat HTN  In ER also concerns for Bakers cyst/ruptured? But no DVT  US shows:  \"2.8 cm superficial hypoechoic structure in the left lateral/posterior kneearea of concern. This is nonspecific and could represent a hematoma\"    PCP: [facility] [id number] in ER    Noted was taking 3 ASA daily for her chronic back pain  Noted in ER one week ago for epistaxis- NO recurrence

## 2025-02-12 VITALS
DIASTOLIC BLOOD PRESSURE: 83 MMHG | HEART RATE: 86 BPM | OXYGEN SATURATION: 98 % | BODY MASS INDEX: 24.75 KG/M2 | RESPIRATION RATE: 18 BRPM | SYSTOLIC BLOOD PRESSURE: 150 MMHG | TEMPERATURE: 98.1 F | HEIGHT: 64 IN | WEIGHT: 145 LBS

## 2025-02-12 LAB
ANION GAP SERPL CALCULATED.3IONS-SCNC: 12 MMOL/L (ref 7–16)
BUN SERPL-MCNC: 17 MG/DL (ref 6–23)
CALCIUM SERPL-MCNC: 9 MG/DL (ref 8.6–10.2)
CHLORIDE SERPL-SCNC: 102 MMOL/L (ref 98–107)
CHOLEST SERPL-MCNC: 207 MG/DL
CO2 SERPL-SCNC: 24 MMOL/L (ref 22–29)
CREAT SERPL-MCNC: 1 MG/DL (ref 0.5–1)
ERYTHROCYTE [DISTWIDTH] IN BLOOD BY AUTOMATED COUNT: 13 % (ref 11.5–15)
GFR, ESTIMATED: 59 ML/MIN/1.73M2
GLUCOSE SERPL-MCNC: 93 MG/DL (ref 74–99)
HBA1C MFR BLD: 5.6 % (ref 4–5.6)
HCT VFR BLD AUTO: 42.2 % (ref 34–48)
HDLC SERPL-MCNC: 55 MG/DL
HGB BLD-MCNC: 13.2 G/DL (ref 11.5–15.5)
LDLC SERPL CALC-MCNC: 121 MG/DL
MCH RBC QN AUTO: 29.3 PG (ref 26–35)
MCHC RBC AUTO-ENTMCNC: 31.3 G/DL (ref 32–34.5)
MCV RBC AUTO: 93.8 FL (ref 80–99.9)
PLATELET # BLD AUTO: 292 K/UL (ref 130–450)
PMV BLD AUTO: 12.3 FL (ref 7–12)
POTASSIUM SERPL-SCNC: 3.9 MMOL/L (ref 3.5–5)
RBC # BLD AUTO: 4.5 M/UL (ref 3.5–5.5)
SODIUM SERPL-SCNC: 138 MMOL/L (ref 132–146)
TRIGL SERPL-MCNC: 156 MG/DL
VLDLC SERPL CALC-MCNC: 31 MG/DL
WBC OTHER # BLD: 9.6 K/UL (ref 4.5–11.5)

## 2025-02-12 PROCEDURE — 97161 PT EVAL LOW COMPLEX 20 MIN: CPT

## 2025-02-12 PROCEDURE — 83036 HEMOGLOBIN GLYCOSYLATED A1C: CPT

## 2025-02-12 PROCEDURE — 36415 COLL VENOUS BLD VENIPUNCTURE: CPT

## 2025-02-12 PROCEDURE — 96372 THER/PROPH/DIAG INJ SC/IM: CPT

## 2025-02-12 PROCEDURE — 85027 COMPLETE CBC AUTOMATED: CPT

## 2025-02-12 PROCEDURE — 6360000002 HC RX W HCPCS: Performed by: NURSE PRACTITIONER

## 2025-02-12 PROCEDURE — 99232 SBSQ HOSP IP/OBS MODERATE 35: CPT | Performed by: INTERNAL MEDICINE

## 2025-02-12 PROCEDURE — 2500000003 HC RX 250 WO HCPCS: Performed by: NURSE PRACTITIONER

## 2025-02-12 PROCEDURE — 6370000000 HC RX 637 (ALT 250 FOR IP): Performed by: STUDENT IN AN ORGANIZED HEALTH CARE EDUCATION/TRAINING PROGRAM

## 2025-02-12 PROCEDURE — 97165 OT EVAL LOW COMPLEX 30 MIN: CPT

## 2025-02-12 PROCEDURE — 80061 LIPID PANEL: CPT

## 2025-02-12 PROCEDURE — 97535 SELF CARE MNGMENT TRAINING: CPT

## 2025-02-12 PROCEDURE — G0378 HOSPITAL OBSERVATION PER HR: HCPCS

## 2025-02-12 PROCEDURE — 80048 BASIC METABOLIC PNL TOTAL CA: CPT

## 2025-02-12 RX ORDER — ATORVASTATIN CALCIUM 40 MG/1
40 TABLET, FILM COATED ORAL NIGHTLY
Qty: 30 TABLET | Refills: 3 | Status: SHIPPED | OUTPATIENT
Start: 2025-02-12

## 2025-02-12 RX ORDER — ATORVASTATIN CALCIUM 40 MG/1
40 TABLET, FILM COATED ORAL NIGHTLY
Status: DISCONTINUED | OUTPATIENT
Start: 2025-02-12 | End: 2025-02-12

## 2025-02-12 RX ORDER — ATORVASTATIN CALCIUM 40 MG/1
40 TABLET, FILM COATED ORAL NIGHTLY
Status: DISCONTINUED | OUTPATIENT
Start: 2025-02-12 | End: 2025-02-12 | Stop reason: HOSPADM

## 2025-02-12 RX ORDER — CLOPIDOGREL BISULFATE 75 MG/1
75 TABLET ORAL DAILY
Qty: 21 TABLET | Refills: 0 | Status: SHIPPED | OUTPATIENT
Start: 2025-02-13 | End: 2025-03-06

## 2025-02-12 RX ADMIN — BUMETANIDE 0.5 MG: 1 TABLET ORAL at 08:48

## 2025-02-12 RX ADMIN — OXYCODONE HYDROCHLORIDE AND ACETAMINOPHEN 1000 MG: 500 TABLET ORAL at 08:48

## 2025-02-12 RX ADMIN — SODIUM CHLORIDE, PRESERVATIVE FREE 10 ML: 5 INJECTION INTRAVENOUS at 08:49

## 2025-02-12 RX ADMIN — CLOPIDOGREL BISULFATE 75 MG: 75 TABLET ORAL at 08:48

## 2025-02-12 RX ADMIN — ENOXAPARIN SODIUM 40 MG: 100 INJECTION SUBCUTANEOUS at 08:48

## 2025-02-12 RX ADMIN — ASPIRIN 81 MG CHEWABLE TABLET 81 MG: 81 TABLET CHEWABLE at 08:48

## 2025-02-12 ASSESSMENT — PAIN SCALES - GENERAL
PAINLEVEL_OUTOF10: 0

## 2025-02-12 NOTE — CARE COORDINATION
CASE MANAGEMENT....Met with patient and daughter at the bedside. Mrs Bobo is independent from home with her . They live in a 2 story. Bedroom on 2 and full baths on both floors. Uses a cane prn. PCP is Dr Hendrix. Has history with [facility] years ago. No MCKAYLA. PT 20/OT pending. Anticipate dc today pending PCP input. Plan is home. No needs. Will follow and assist accordingly.

## 2025-02-12 NOTE — PLAN OF CARE
Problem: ABCDS Injury Assessment  Goal: Absence of physical injury  2/12/2025 1536 by Yenni Layton RN  Outcome: Completed     Problem: Discharge Planning  Goal: Discharge to home or other facility with appropriate resources  2/12/2025 1536 by Yenni Layton RN  Outcome: Completed     Problem: Safety - Adult  Goal: Free from fall injury  2/12/2025 1536 by Yenni Layton RN  Outcome: Completed     Problem: Pain  Goal: Verbalizes/displays adequate comfort level or baseline comfort level  2/12/2025 1536 by Yenni Layton RN  Outcome: Completed

## 2025-02-12 NOTE — DISCHARGE INSTRUCTIONS
START taking Plavix 75 mg daily for 21 days  Please take ASA 81 mg daily starting today.   Please wear compression socks in both legs during day time and when ambulating  Please limit ambulation to improve your leg pain.   Please wear knee brace to reduce pain from baker cyst. At left leg  Follow up with your primary care provider with 7~14 days.   Follow out patient with ortho for left knee backer cyst. Concern for rupture (age undetermined)

## 2025-02-12 NOTE — PROGRESS NOTES
4 Eyes Skin Assessment     NAME:  Sandra Bobo  YOB: 1950  MEDICAL RECORD NUMBER:  [id number]    The patient is being assessed for  Admission    I agree that at least one RN has performed a thorough Head to Toe Skin Assessment on the patient. ALL assessment sites listed below have been assessed.      Areas assessed by both nurses:    Head, Face, Ears, Shoulders, Back, Chest, Arms, Elbows, Hands, Sacrum. Buttock, Coccyx, Ischium, and Legs. Feet and Heels        Does the Patient have a Wound? No noted wound(s)       Rakesh Prevention initiated by RN: No  Wound Care Orders initiated by RN: No    Pressure Injury (Stage 3,4, Unstageable, DTI, NWPT, and Complex wounds) if present, place Wound referral order by RN under : No    New Ostomies, if present place, Ostomy referral order under : No     Nurse 1 eSignature: Electronically signed by Tram De La Cruz RN on 2/11/25 at 5:36 PM EST    **SHARE this note so that the co-signing nurse can place an eSignature**    Nurse 2 eSignature: Electronically signed by Blair Hein RN on 2/11/25 at 6:37 PM EST  
CLINICAL PHARMACY NOTE: MEDS TO BEDS    Total # of Prescriptions Filled: 2   The following medications were delivered to the patient:  Plavis 75 mg  Atorvastatin 40 mg    Additional Documentation:    
Database initiated. Patient is A&O independent from home with . States she uses no assistive devices and is RA at baseline. She is hard of hearing.   
Occupational Therapy  OCCUPATIONAL THERAPY INITIAL EVALUATION  [facility]  [address] [place], [place]    Date: 2025     Patient Name: Sandra Bobo  MRN: [id number]  : 1950  Room: [address] [place] [address]    Evaluating OT: Nehal Toscano, OTR/L - [id number]    Referring Provider: Lukas Baron MD  Specific Provider Orders/Date: \"OT eval and treat\" - 2025    Diagnosis: Left arm numbness [R20.0]  Stroke-like symptoms [R29.90]  Acute intractable headache, unspecified headache type [R51.9]     Pertinent Medical History: back pain, HTN, colitis     Precautions: fall risk, double/blurred vision    Assessment of Current Deficits:    [x] Functional mobility   [x] ADLs  [x] Strength               [x] Cognition   [x] Functional transfers   [x] IADLs         [x] Safety Awareness   [x] Endurance   [] Fine Motor Coordination  [x] Balance      [] Vision/Perception   [x] Sensation    [] Gross Motor Coordination [] ROM          [] Delirium                  [] Motor Control     OT PLAN OF CARE   OT POC is based on physician orders, patient diagnosis, and results of clinical assessment.  Frequency/Duration 2-5 days/week for 2-4 weeks PRN   Specific OT Treatment Interventions to Include:   * Instruction/training on adapted ADL techniques and AE recommendations to increase functional independence within precautions       * Training on energy conservation strategies, correct breathing pattern and techniques to improve independence/tolerance for self-care routine  * Functional transfer/mobility training/DME recommendations for increased independence, safety, and fall prevention  * Patient/Family education to increase follow through with safety techniques and functional independence  * Recommendation of environmental modifications for increased safety with functional transfers/mobility and ADLs  * Visual-perceptual training to improve environmental scanning, visual 
Perfect serve sent to Dr. Baron needing admission orders.  
Physical Therapy  Facility/Department: [address] INTERMEDIATE 1  Physical Therapy Initial Assessment    Name: Sandra Bobo  : 1950  MRN: [id number]  Date of Service: 2025        Patient Diagnosis(es): The primary encounter diagnosis was Left arm numbness. A diagnosis of Acute intractable headache, unspecified headache type was also pertinent to this visit.  Past Medical History:  has a past medical history of Back pain, Colitis, and Hypertension.  Past Surgical History:  has a past surgical history that includes cyst removal and Colonoscopy.      Evaluating Therapist: Vanessa Avila PT    Room #:  0442/0442-A  Diagnosis:  Left arm numbness [R20.0]  Stroke-like symptoms [R29.90]  Acute intractable headache, unspecified headache type [R51.9]  PMHx/PSHx:  Back pain, colitis, HTN  Precautions:  falls, alarm      Social:  Pt lives with  in a 2 floor plan bedroom and bathroom second floor.   Prior to admission independent all areas without device. Has a cane     Initial Evaluation  Date: 25 Treatment      Short Term/ Long Term   Goals   Was pt agreeable to Eval/treatment? yes     Does pt have pain? No c/o pain     Bed Mobility  Rolling: independent  Supine to sit: independent  Sit to supine: NT  Scooting: independent  independent   Transfers Sit to stand: supervision  Stand to sit: supervision  Stand pivot: supervision  Independent    Ambulation    125 feet with no device with CG/SBA with occasional use of hallrail  150 feet with AAD independent   Stair Negotiation  Ascended and descended  NT   12 steps with 1 rail independent   LE strength     4-/5    4/5   balance      Fair+     AM-PAC Raw score               20/24         Pt is alert and Oriented   LE ROM: WFL  Sensation: intact  Edema: none  Endurance: fair+  Chair alarm: yes     ASSESSMENT:    Pt displays functional ability as noted in the objective portion of this evaluation.      Patient education  Pt educated on PT objectives    Patient response 
  CO2 25   BUN 15   CREATININE 1.0   GLUCOSE 137*   CALCIUM 9.4       Recent Labs     02/10/25  2245   WBC 9.0   RBC 4.65   HGB 13.4   HCT 42.4   MCV 91.2   MCH 28.8   MCHC 31.6*   RDW 12.9      MPV 12.2*       Assessment:    Principal Problem:    Stroke-like symptoms  Active Problems:    Primary hypertension    Headache    Left arm numbness  Resolved Problems:    * No resolved hospital problems. *      Plan:  Stroke-like symptoms: TIA vs complex migraine vs HTN emergency. NIHSS of 0.  CT head unremarkable. MRI showing Chronic microvascular disease without acute intracranial abnormality.  Currently asymptomatic.  EKG unremarkable.  Lipid panel, A1c.  DAPT.  OP follow-up.  Headache: Benadryl, Toradol and Zofran given.  Left Baker cyst: concern for rupture. US leg neg for DVT. CHE encouraged Op orthopedic f/u. Number provided to patient.   BLE swelling w/o edema: long standing hours. compression stocking. Exercise as tolerated.   HLD: Elevated cholesterol.  Start statin therapy.  Side effects discussed.  PCP follow-up.  HTN: /90 on admission. On bumex for NHT for year. No change. Currently at goal.  Resume bumex. Defer further HTN meds to PCP.    CODE STATUS: Full  DVT/GI prophylaxis: Lovenox/diet    Dispo: Home, later today once labs resolved.    NOTE: This report was transcribed using voice recognition software. Every effort was made to ensure accuracy; however, inadvertent computerized transcription errors may be present.  Electronically signed by Suraj Weston MD on 2/12/2025 at 8:35 AM

## 2025-02-12 NOTE — ACP (ADVANCE CARE PLANNING)
Advance Care Planning   Healthcare Decision Maker:    Primary Decision Maker: Beck Bobo - [facility] - [phone]

## 2025-02-12 NOTE — DISCHARGE SUMMARY
[facility] Hospitalist Physician Discharge Summary       Андрей Hackett MD  [address]  [address]  [facility] [address]  [phone]    Call in 1 week(s)  For left baker cyst, hip pain edithal    [facility]  [address], [place] [id number]  Call in 1 week(s)  Orthopedic follow up      Activity level: As tolerated     Dispo: Home, family to transport      Condition on discharge: Stable     Patient ID:  Sandra Bobo  [id number]  74 y.o.  1950    Admit date: 2/10/2025    Discharge date and time:  2/12/2025  3:22 PM    Admission Diagnoses: Principal Problem:    Stroke-like symptoms  Active Problems:    Primary hypertension    Headache    Left arm numbness  Resolved Problems:    * No resolved hospital problems. *      Discharge Diagnoses: Principal Problem:    Stroke-like symptoms  Active Problems:    Primary hypertension    Headache    Left arm numbness  Resolved Problems:    * No resolved hospital problems. *      Consults:  IP CONSULT TO HOSPITALIST    Procedures:     Hospital Course:   Patient Sandra Bobo is a 74 y.o. presented with Left arm numbness [R20.0]  Stroke-like symptoms [R29.90]  Acute intractable headache, unspecified headache type [R51.9]    Patient is a 74 y.o. female with a history of back pain, colitis, and HTN presented with sudden onset of left arm numbness, headache, palpitations, and oral tongue swelling. Symptoms began while reading the newspaper but resolved by the time she arrived in the ED, except for a persistent frontal headache. She denied fever, chills, SOB, CP, or bowel/bladder changes.    CT head, CTA head/neck were negative for acute stroke. MRI showed chronic microvascular disease without acute intracranial abnormalities. NIHSS was 0. EKG was unremarkable. She was treated with Benadryl, Toradol, Ativan, Solu-Medrol, Zofran, and IV fluids. BP was 190/90 on admission, but no medication adjustments were

## 2025-02-13 ENCOUNTER — TELEPHONE (OUTPATIENT)
Dept: ENT CLINIC | Age: 75
End: 2025-02-13

## 2025-02-13 NOTE — TELEPHONE ENCOUNTER
New patient referral  Epistaxis. Please advise patient for scheduling recommendations  [phone]

## 2025-02-13 NOTE — TELEPHONE ENCOUNTER
Called patient to discuss current symptoms patient states had stroke was seen in ER recurrent epistaxis lasting 10 minutes each time was cauterized 2/1/25. Patient is on aspirin 81mg daily. Patient uses saline nasal gel.

## 2025-02-26 ENCOUNTER — OFFICE VISIT (OUTPATIENT)
Dept: ENT CLINIC | Age: 75
End: 2025-02-26
Payer: MEDICARE

## 2025-02-26 VITALS
WEIGHT: 147.7 LBS | HEIGHT: 64 IN | RESPIRATION RATE: 14 BRPM | TEMPERATURE: 98.2 F | HEART RATE: 88 BPM | BODY MASS INDEX: 25.22 KG/M2 | SYSTOLIC BLOOD PRESSURE: 135 MMHG | OXYGEN SATURATION: 93 % | DIASTOLIC BLOOD PRESSURE: 80 MMHG

## 2025-02-26 DIAGNOSIS — R04.0 EPISTAXIS: Primary | ICD-10-CM

## 2025-02-26 PROCEDURE — 99204 OFFICE O/P NEW MOD 45 MIN: CPT | Performed by: OTOLARYNGOLOGY

## 2025-02-26 PROCEDURE — 3079F DIAST BP 80-89 MM HG: CPT | Performed by: OTOLARYNGOLOGY

## 2025-02-26 PROCEDURE — 1123F ACP DISCUSS/DSCN MKR DOCD: CPT | Performed by: OTOLARYNGOLOGY

## 2025-02-26 PROCEDURE — 3075F SYST BP GE 130 - 139MM HG: CPT | Performed by: OTOLARYNGOLOGY

## 2025-02-26 ASSESSMENT — ENCOUNTER SYMPTOMS
RESPIRATORY NEGATIVE: 1
VOICE CHANGE: 0
SINUS PRESSURE: 0
SINUS PAIN: 0
RHINORRHEA: 0
EYES NEGATIVE: 1
GASTROINTESTINAL NEGATIVE: 1
TROUBLE SWALLOWING: 0

## 2025-02-26 NOTE — PROGRESS NOTES
Subjective:      Patient ID:  Sandra Bobo is a 74 y.o. female.    HPI:  Recurrent Epistaxis  The patient is a 74 y.o. female who presents with epistaxis.The patient reports nosebleeds for a few months.  They usually occur on the right.    Pt was was in the ER about 4 weeks ago. Has not experienced any further bleeding.   was cauterized on the right side   was not packed on the bilateral side.     This is not the patients first event of epistaxis.   Prior therapy has included nothing additional.      Chronic O2? no    There is not history of easy bruising or bleeding.     Pt is  on anticoagulation therapy - ASA      Other epistaxis risk factors: hypertension, no specific cause    Past Medical History:   Diagnosis Date    Back pain     Colitis     Hypertension      Past Surgical History:   Procedure Laterality Date    COLONOSCOPY      CYST REMOVAL      ovarian     History reviewed. No pertinent family history.  Social History     Socioeconomic History    Marital status:      Spouse name: None    Number of children: None    Years of education: None    Highest education level: None   Tobacco Use    Smoking status: Never    Smokeless tobacco: Never   Substance and Sexual Activity    Alcohol use: No    Drug use: No     Social Determinants of Health     Food Insecurity: No Food Insecurity (2/11/2025)    Hunger Vital Sign     Worried About Running Out of Food in the Last Year: Never true     Ran Out of Food in the Last Year: Never true   Transportation Needs: No Transportation Needs (2/11/2025)    PRAPARE - Transportation     Lack of Transportation (Medical): No     Lack of Transportation (Non-Medical): No   Housing Stability: Low Risk  (2/11/2025)    Housing Stability Vital Sign     Unable to Pay for Housing in the Last Year: No     Number of Times Moved in the Last Year: 0     Homeless in the Last Year: No     Allergies   Allergen Reactions    Latex Swelling    Fish-Derived Products Anaphylaxis    Iodine

## 2025-02-28 ENCOUNTER — OFFICE VISIT (OUTPATIENT)
Dept: ORTHOPEDIC SURGERY | Age: 75
End: 2025-02-28
Payer: MEDICARE

## 2025-02-28 VITALS
DIASTOLIC BLOOD PRESSURE: 89 MMHG | BODY MASS INDEX: 24.24 KG/M2 | SYSTOLIC BLOOD PRESSURE: 140 MMHG | HEIGHT: 64 IN | RESPIRATION RATE: 20 BRPM | TEMPERATURE: 98.1 F | OXYGEN SATURATION: 95 % | WEIGHT: 142 LBS | HEART RATE: 85 BPM

## 2025-02-28 DIAGNOSIS — M25.562 ACUTE PAIN OF LEFT KNEE: Primary | ICD-10-CM

## 2025-02-28 PROCEDURE — 3077F SYST BP >= 140 MM HG: CPT | Performed by: NURSE PRACTITIONER

## 2025-02-28 PROCEDURE — 1159F MED LIST DOCD IN RCRD: CPT | Performed by: NURSE PRACTITIONER

## 2025-02-28 PROCEDURE — 99203 OFFICE O/P NEW LOW 30 MIN: CPT | Performed by: NURSE PRACTITIONER

## 2025-02-28 PROCEDURE — 3079F DIAST BP 80-89 MM HG: CPT | Performed by: NURSE PRACTITIONER

## 2025-02-28 PROCEDURE — 1123F ACP DISCUSS/DSCN MKR DOCD: CPT | Performed by: NURSE PRACTITIONER

## 2025-02-28 RX ORDER — ASPIRIN 81 MG/1
81 TABLET ORAL DAILY
COMMUNITY

## 2025-02-28 NOTE — PROGRESS NOTES
Lower Extremity:   Ipsilateral hip exam shows normal range of motion without pain with impingement testing.      Left knee exam displayed mild swelling.  Skin was warm dry and intact.  No obvious deformity or tenderness to the posterior knee.  Stable valgus varus stress testing.  Patella tracked midline.  Knee is grossly stable on exam.       IMAGING:    XR: 4 views of the left knee show joint space narrowing on tunnel view.  Chondrocalcinosis present to the medial and lateral compartments.        ASSESSMENT  Left knee pain    PLAN  Today we discussed her left knee.  Reports orthopedic evaluation recommended after recently undergoing a lower extremity ultrasound to rule out DVT which showed incidental finding of a Baker's cyst.  No obvious palpable deformity or tenderness to the posterior knee on exam.  She does show degenerative changes on weightbearing x-rays.  She displays functional range of motion and stability on physical exam.  Conservative treatment options were discussed today.  Patient is hopeful to avoid corticosteroid injections due to an extensive allergy list.  We did discuss alternatives which include viscosupplement ejections as well as PRP injection.  She was provided home exercises she can perform independently.  Will continue with OTC medications as needed.  She will call to schedule follow-up ointment as needed.        Cash Domingo CNP  Orthopaedic Surgery   2/28/25  10:17 AM